# Patient Record
Sex: MALE | Race: WHITE | NOT HISPANIC OR LATINO | Employment: OTHER | URBAN - METROPOLITAN AREA
[De-identification: names, ages, dates, MRNs, and addresses within clinical notes are randomized per-mention and may not be internally consistent; named-entity substitution may affect disease eponyms.]

---

## 2017-08-23 ENCOUNTER — HOSPITAL ENCOUNTER (OUTPATIENT)
Dept: RADIOLOGY | Facility: HOSPITAL | Age: 72
Discharge: HOME | End: 2017-08-23
Attending: RADIOLOGY

## 2017-08-23 DIAGNOSIS — Z76.89 REFERRAL OF PATIENT WITHOUT EXAMINATION OR TREATMENT: ICD-10-CM

## 2017-08-24 ENCOUNTER — GENERIC CONVERSION - ENCOUNTER (OUTPATIENT)
Dept: OTHER | Facility: OTHER | Age: 72
End: 2017-08-24

## 2017-08-25 ENCOUNTER — ALLSCRIPTS OFFICE VISIT (OUTPATIENT)
Dept: OTHER | Facility: OTHER | Age: 72
End: 2017-08-25

## 2017-08-25 ENCOUNTER — TRANSCRIBE ORDERS (OUTPATIENT)
Dept: ADMINISTRATIVE | Facility: HOSPITAL | Age: 72
End: 2017-08-25

## 2017-08-25 DIAGNOSIS — R93.89 ABNORMAL RADIOLOGICAL FINDINGS IN SKIN AND SUBCUTANEOUS TISSUE: Primary | ICD-10-CM

## 2017-08-29 ENCOUNTER — GENERIC CONVERSION - ENCOUNTER (OUTPATIENT)
Dept: OTHER | Facility: OTHER | Age: 72
End: 2017-08-29

## 2017-10-02 ENCOUNTER — GENERIC CONVERSION - ENCOUNTER (OUTPATIENT)
Dept: OTHER | Facility: OTHER | Age: 72
End: 2017-10-02

## 2017-10-05 DIAGNOSIS — R93.89 ABNORMAL FINDINGS ON DIAGNOSTIC IMAGING OF OTHER SPECIFIED BODY STRUCTURES: ICD-10-CM

## 2018-01-12 NOTE — MISCELLANEOUS
Message  Per Dr Nannette Astorga, Patient has a new patient appointment scheduled for October 2017  After reviewing the results of his CT chest Dr Nannette Astorga is willing to see him at 2:00pm Friday 8/25/17  Messages were left on both his phone and his cellphone with no response        Signatures   Electronically signed by : Dong Bhatia, ; Aug 24 2017 11:24AM EST                       (Author)

## 2018-01-14 VITALS
BODY MASS INDEX: 27.42 KG/M2 | HEIGHT: 62 IN | RESPIRATION RATE: 16 BRPM | OXYGEN SATURATION: 95 % | HEART RATE: 87 BPM | WEIGHT: 149 LBS | SYSTOLIC BLOOD PRESSURE: 160 MMHG | TEMPERATURE: 98.3 F | DIASTOLIC BLOOD PRESSURE: 100 MMHG

## 2018-01-15 NOTE — MISCELLANEOUS
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

## 2018-01-17 NOTE — MISCELLANEOUS
Message  I spoke with Ebb Push by telephone after discussing with his primary care provider, Dr Trevor Godoy  Ebb Michelle did cancel his CT scan follow-up appointment with me  He did not feel that he was understanding what was going on in requested seeing another provider  Dr Trevor Godoy discussed with me approximately one week ago and I did reach out Ebkolby Martinez to discuss further  After explaining and discussing with oral a little further, he is still insistent upon seeing another provider and has canceled all appointments with me  He is waiting for his primary care doctor to set up a another follow-up  I did follow up with his primary care physician after our conversation today and explained my conversation with Kayli Martinez  He is going to reach out to him and hopefully convinced him to maintain follow-up  At this point I am awaiting a call from the patient if Dr Trevor Godoy he is able to convince him to come back to see me   Otherwise Dr Trevor Godoy will arrange for his subsequent follow-up with an alternate provider      Signatures   Electronically signed by : MARIO Howell ; Oct  2 2017  4:14PM EST                       (Author)